# Patient Record
Sex: MALE | URBAN - METROPOLITAN AREA
[De-identification: names, ages, dates, MRNs, and addresses within clinical notes are randomized per-mention and may not be internally consistent; named-entity substitution may affect disease eponyms.]

---

## 2023-07-05 ENCOUNTER — OFFICE VISIT (OUTPATIENT)
Dept: URBAN - METROPOLITAN AREA CLINIC 23 | Facility: CLINIC | Age: 38
End: 2023-07-05
Payer: COMMERCIAL

## 2023-07-05 DIAGNOSIS — H10.212 ACUTE CHEMICAL CONJUNCTIVITIS OF LEFT EYE: Primary | ICD-10-CM

## 2023-07-05 PROCEDURE — 99203 OFFICE O/P NEW LOW 30 MIN: CPT | Performed by: OPTOMETRIST

## 2023-07-05 RX ORDER — PREDNISOLONE ACETATE 10 MG/ML
1 % SUSPENSION/ DROPS OPHTHALMIC
Qty: 5 | Refills: 0 | Status: ACTIVE
Start: 2023-07-05

## 2023-07-05 NOTE — IMPRESSION/PLAN
Impression: Acute chemical conjunctivitis of left eye: H10.212. Plan: Pt edu on all findings. Continue Eryth. Start Pred Acetate TID OS as prescribed. Instructed patient to call if symptoms worsen or do not resolve. RTC PRN.